# Patient Record
Sex: MALE | Race: WHITE | NOT HISPANIC OR LATINO | Employment: FULL TIME | URBAN - METROPOLITAN AREA
[De-identification: names, ages, dates, MRNs, and addresses within clinical notes are randomized per-mention and may not be internally consistent; named-entity substitution may affect disease eponyms.]

---

## 2023-11-28 RX ORDER — IBUPROFEN 800 MG/1
800 TABLET ORAL EVERY 8 HOURS PRN
Refills: 2 | OUTPATIENT
Start: 2023-11-28 | End: 2023-12-28

## 2023-11-28 RX ORDER — IBUPROFEN 800 MG/1
800 TABLET ORAL EVERY 8 HOURS PRN
COMMUNITY
Start: 2023-08-06 | End: 2024-03-26 | Stop reason: SDUPTHER

## 2023-11-28 NOTE — TELEPHONE ENCOUNTER
Patient requesting refill on Ibuprofen 800 mg one every 8 hours PRN.  Pharmacy [facility]

## 2024-03-22 DIAGNOSIS — I10 HYPERTENSION, UNSPECIFIED TYPE: Primary | ICD-10-CM

## 2024-03-22 DIAGNOSIS — M19.90 ARTHRITIS: ICD-10-CM

## 2024-03-25 RX ORDER — LISINOPRIL 40 MG/1
40 TABLET ORAL DAILY
Qty: 90 TABLET | Refills: 1 | Status: SHIPPED | OUTPATIENT
Start: 2024-03-25

## 2024-03-26 RX ORDER — IBUPROFEN 800 MG/1
800 TABLET ORAL EVERY 8 HOURS PRN
Qty: 90 TABLET | Refills: 0 | Status: SHIPPED | OUTPATIENT
Start: 2024-03-26

## 2024-06-27 ENCOUNTER — APPOINTMENT (OUTPATIENT)
Dept: PRIMARY CARE | Facility: CLINIC | Age: 69
End: 2024-06-27
Payer: COMMERCIAL

## 2024-06-27 VITALS
BODY MASS INDEX: 23.42 KG/M2 | HEIGHT: 67 IN | TEMPERATURE: 97.6 F | OXYGEN SATURATION: 99 % | WEIGHT: 149.2 LBS | SYSTOLIC BLOOD PRESSURE: 128 MMHG | DIASTOLIC BLOOD PRESSURE: 70 MMHG | HEART RATE: 94 BPM

## 2024-06-27 DIAGNOSIS — Z87.891 PERSONAL HISTORY OF NICOTINE DEPENDENCE: ICD-10-CM

## 2024-06-27 DIAGNOSIS — Z91.89 AT RISK FOR CARDIOVASCULAR EVENT: ICD-10-CM

## 2024-06-27 DIAGNOSIS — I10 PRIMARY HYPERTENSION: ICD-10-CM

## 2024-06-27 DIAGNOSIS — Z12.11 SCREEN FOR COLON CANCER: ICD-10-CM

## 2024-06-27 DIAGNOSIS — Z13.6 SCREENING FOR HEART DISEASE: ICD-10-CM

## 2024-06-27 DIAGNOSIS — Z12.2 SCREENING FOR LUNG CANCER: ICD-10-CM

## 2024-06-27 DIAGNOSIS — M19.90 ARTHRITIS: ICD-10-CM

## 2024-06-27 DIAGNOSIS — Z00.00 MEDICARE ANNUAL WELLNESS VISIT, SUBSEQUENT: Primary | ICD-10-CM

## 2024-06-27 RX ORDER — IBUPROFEN 800 MG/1
800 TABLET ORAL EVERY 8 HOURS PRN
Qty: 90 TABLET | Refills: 0 | Status: SHIPPED | OUTPATIENT
Start: 2024-06-27

## 2024-06-27 RX ORDER — ROSUVASTATIN CALCIUM 10 MG/1
10 TABLET, COATED ORAL DAILY
Qty: 30 TABLET | Refills: 11 | Status: SHIPPED | OUTPATIENT
Start: 2024-06-27 | End: 2025-06-27

## 2024-06-27 RX ORDER — LISINOPRIL 40 MG/1
40 TABLET ORAL DAILY
Qty: 90 TABLET | Refills: 1 | Status: SHIPPED | OUTPATIENT
Start: 2024-06-27

## 2024-06-27 ASSESSMENT — ACTIVITIES OF DAILY LIVING (ADL)
DRESSING: INDEPENDENT
MANAGING_FINANCES: INDEPENDENT
DOING_HOUSEWORK: INDEPENDENT
TAKING_MEDICATION: INDEPENDENT
TAKING_MEDICATION: INDEPENDENT
BATHING: INDEPENDENT
DRESSING: INDEPENDENT
BATHING: INDEPENDENT
GROCERY_SHOPPING: INDEPENDENT
MANAGING_FINANCES: INDEPENDENT
DOING_HOUSEWORK: INDEPENDENT
GROCERY_SHOPPING: INDEPENDENT

## 2024-06-27 ASSESSMENT — COLUMBIA-SUICIDE SEVERITY RATING SCALE - C-SSRS
6. HAVE YOU EVER DONE ANYTHING, STARTED TO DO ANYTHING, OR PREPARED TO DO ANYTHING TO END YOUR LIFE?: NO
2. HAVE YOU ACTUALLY HAD ANY THOUGHTS OF KILLING YOURSELF?: NO
1. IN THE PAST MONTH, HAVE YOU WISHED YOU WERE DEAD OR WISHED YOU COULD GO TO SLEEP AND NOT WAKE UP?: NO

## 2024-06-27 ASSESSMENT — PATIENT HEALTH QUESTIONNAIRE - PHQ9
SUM OF ALL RESPONSES TO PHQ9 QUESTIONS 1 AND 2: 0
1. LITTLE INTEREST OR PLEASURE IN DOING THINGS: NOT AT ALL
1. LITTLE INTEREST OR PLEASURE IN DOING THINGS: NOT AT ALL
2. FEELING DOWN, DEPRESSED OR HOPELESS: NOT AT ALL
2. FEELING DOWN, DEPRESSED OR HOPELESS: NOT AT ALL
SUM OF ALL RESPONSES TO PHQ9 QUESTIONS 1 AND 2: 0

## 2024-06-27 ASSESSMENT — ENCOUNTER SYMPTOMS
OCCASIONAL FEELINGS OF UNSTEADINESS: 0
DEPRESSION: 0
LOSS OF SENSATION IN FEET: 0

## 2024-06-27 ASSESSMENT — PAIN SCALES - GENERAL: PAINLEVEL: 4

## 2024-06-27 NOTE — PROGRESS NOTES
Today's Date: 6/27/2024       Subjective:     HPI: Ovidio Pisano is a 68 y.o. who presents today for health maintenance.     Hand arthritis - takes Ibuprofen 800mg as needed    HTN - not taking home Bps. Taking lisinopril 40mg    The 10-year ASCVD risk score (Dar VILLEGAS, et al., 2019) is: 25.1%    Values used to calculate the score:      Age: 68 years      Sex: Male      Is Non- : No      Diabetic: No      Tobacco smoker: Yes      Systolic Blood Pressure: 128 mmHg      Is BP treated: Yes      HDL Cholesterol: 36.2 mg/dL      Total Cholesterol: 191 mg/dL     Social history  Diet: eating healthy, decreased portions  Exercise: lift weights  Caffeine, EtOH, tobacco, drugs: coffee daily, 2 beers/day, used to smoke 2ppd, now down to 4-5 cig/day  Stress: controlled  Retired , built motors, drag races  SIGECAPS: negative     Preventative history  Flu/Vaccinations: due for RSV, PCV, shingrix, tdap  Colonoscopy: due for it  CT lung due    Review of Systems:     GEN: Denies fever, chills, weight loss  HEENT: Denies blurred vision, changes in hearing, sore throat, sinus congestion   Cardio: Denies chest pain, palpitations  Pulm: Denies shortness of breath, cough, wheezing  GI: Denies nausea, vomiting, diarrhea, blood in the stool  : Denies burning with urination, blood in the urine  Musculoskeletal: Denies muscle aches, joint aches  Skin: Denies rashes, itching  Neuro: Denies headaches, dizziness  Psyc: Denies depressed mood, thoughts of suicide      Allergies:   Allergies   Allergen Reactions    Penicillins Unknown        Current Medications:     Current Outpatient Medications:     ibuprofen 800 mg tablet, Take 1 tablet (800 mg) by mouth every 8 hours if needed for mild pain (1 - 3)., Disp: 90 tablet, Rfl: 0    lisinopril 40 mg tablet, TAKE 1 TABLET BY MOUTH EVERY DAY, Disp: 90 tablet, Rfl: 1     Past Medical History:  Past Medical History:   Diagnosis Date    Other fracture of left lower  "leg, initial encounter for closed fracture     Ankle fracture, left        Past Surgical History:   Past Surgical History:   Procedure Laterality Date    GALLBLADDER SURGERY  04/07/2017    Gallbladder Surgery    VASECTOMY  04/07/2017    Surgery Vas Deferens Vasectomy        Family History:  No family history on file.     Social History:  Social History     Socioeconomic History    Marital status:      Spouse name: Not on file    Number of children: Not on file    Years of education: Not on file    Highest education level: Not on file   Occupational History    Not on file   Tobacco Use    Smoking status: Every Day     Current packs/day: 0.25     Types: Cigarettes    Smokeless tobacco: Never    Tobacco comments:     Wants to stop smoking   Vaping Use    Vaping status: Never Used   Substance and Sexual Activity    Alcohol use: Yes     Alcohol/week: 8.0 standard drinks of alcohol     Types: 8 Cans of beer per week    Drug use: Never    Sexual activity: Not on file   Other Topics Concern    Not on file   Social History Narrative    Not on file     Social Determinants of Health     Financial Resource Strain: Not on file   Food Insecurity: Not on file   Transportation Needs: Not on file   Physical Activity: Not on file   Stress: Not on file   Social Connections: Not on file   Intimate Partner Violence: Not on file   Housing Stability: Not on file        Objective:  Vitals: Blood pressure 128/70, pulse 94, temperature 36.4 °C (97.6 °F), height 1.702 m (5' 7\"), weight 67.7 kg (149 lb 3.2 oz), SpO2 99%.  Body mass index is 23.37 kg/m².     Physical Examination:      General: No acute distress, well nourished  Eyes: EOMI  Ears: External ears intact, no gross fluid drainage, swelling, or erythema  ENT: Neck is supple, mucous membranes are pink and moist, no gross masses  Pulmonary: Clear to auscultation bilaterally, no wheezing, no rales, no accessory muscle use  Cardiac: RRR, systolic murmur 3/6  Abdomen: Soft, non " tender, non distended  Extremities: No clubbing, cyanosis, or edema  Psychiatric: Affect and mood is appropriate  Neurologic: AOx3, CN II-XII grossly in tact  Skin: No rashes observed, warm     Assessment and Plan:     Problem List Items Addressed This Visit    None  Visit Diagnoses       Medicare annual wellness visit, subsequent    -  Primary    Arthritis        Screen for colon cancer        Primary hypertension        At risk for cardiovascular event        Screening for lung cancer        Screening for heart disease              -Extremely active individual, actively working on completely stopping smoking.  We had a conversation for about 5 minutes regarding smoking cessation he is highly motivated to do this on his own without the assistance of nicotine replacement or medications.  - Hypertension stable, continue lisinopril  - Arthritis, using ibuprofen as needed  - ASCVD 25%, ordered CT cardiac scoring and will start rosuvastatin 10 mg  - As he is still smoking, yearly CT lung screening ordered  - Colonoscopy ordered  - Labs as above  - He is due for 4 vaccines as above, he will get these done at the pharmacy  - At next visit, discuss AAA screening    All questions answered. Patient understands and agrees to the plan.    Dano Yip DO  Sports Medicine Fellow  [facility] [place]

## 2024-07-24 DIAGNOSIS — M19.90 ARTHRITIS: ICD-10-CM

## 2024-07-24 RX ORDER — IBUPROFEN 800 MG/1
800 TABLET ORAL EVERY 8 HOURS PRN
Qty: 90 TABLET | Refills: 0 | Status: SHIPPED | OUTPATIENT
Start: 2024-07-24

## 2024-10-14 DIAGNOSIS — M19.90 ARTHRITIS: ICD-10-CM

## 2024-10-14 RX ORDER — IBUPROFEN 800 MG/1
800 TABLET ORAL EVERY 8 HOURS PRN
Qty: 90 TABLET | Refills: 0 | Status: SHIPPED | OUTPATIENT
Start: 2024-10-14

## 2024-10-14 NOTE — TELEPHONE ENCOUNTER
Patient called for refill for Ibuprofen 800mg takes one every 8 hours PRN pain. # 90  Pharmacy [facility]

## 2024-11-09 DIAGNOSIS — M19.90 ARTHRITIS: ICD-10-CM

## 2024-11-11 RX ORDER — IBUPROFEN 800 MG/1
800 TABLET ORAL EVERY 8 HOURS PRN
Qty: 90 TABLET | Refills: 0 | Status: SHIPPED | OUTPATIENT
Start: 2024-11-11

## 2024-12-12 DIAGNOSIS — M19.90 ARTHRITIS: ICD-10-CM

## 2024-12-12 RX ORDER — IBUPROFEN 800 MG/1
800 TABLET ORAL EVERY 8 HOURS PRN
Qty: 90 TABLET | Refills: 0 | Status: SHIPPED | OUTPATIENT
Start: 2024-12-12

## 2025-01-14 DIAGNOSIS — M19.90 ARTHRITIS: ICD-10-CM

## 2025-01-15 RX ORDER — IBUPROFEN 800 MG/1
800 TABLET ORAL EVERY 8 HOURS PRN
Qty: 90 TABLET | Refills: 0 | OUTPATIENT
Start: 2025-01-15

## 2025-01-21 DIAGNOSIS — M19.90 ARTHRITIS: ICD-10-CM

## 2025-01-21 RX ORDER — IBUPROFEN 800 MG/1
800 TABLET ORAL EVERY 8 HOURS PRN
Qty: 90 TABLET | Refills: 0 | Status: SHIPPED | OUTPATIENT
Start: 2025-01-21

## 2025-02-21 DIAGNOSIS — M19.90 ARTHRITIS: ICD-10-CM

## 2025-02-21 RX ORDER — IBUPROFEN 800 MG/1
800 TABLET ORAL EVERY 8 HOURS PRN
Qty: 90 TABLET | Refills: 0 | Status: SHIPPED | OUTPATIENT
Start: 2025-02-21

## 2025-02-24 ENCOUNTER — APPOINTMENT (OUTPATIENT)
Dept: RADIOLOGY | Facility: HOSPITAL | Age: 70
End: 2025-02-24
Payer: MEDICARE

## 2025-02-24 ENCOUNTER — HOSPITAL ENCOUNTER (EMERGENCY)
Facility: HOSPITAL | Age: 70
Discharge: HOME | End: 2025-02-24
Attending: STUDENT IN AN ORGANIZED HEALTH CARE EDUCATION/TRAINING PROGRAM
Payer: MEDICARE

## 2025-02-24 VITALS
RESPIRATION RATE: 16 BRPM | BODY MASS INDEX: 23.54 KG/M2 | TEMPERATURE: 97.7 F | OXYGEN SATURATION: 96 % | HEART RATE: 98 BPM | DIASTOLIC BLOOD PRESSURE: 92 MMHG | WEIGHT: 150 LBS | SYSTOLIC BLOOD PRESSURE: 148 MMHG | HEIGHT: 67 IN

## 2025-02-24 DIAGNOSIS — S61.209A AVULSION OF FINGER, INITIAL ENCOUNTER: Primary | ICD-10-CM

## 2025-02-24 PROCEDURE — 99284 EMERGENCY DEPT VISIT MOD MDM: CPT | Mod: 25

## 2025-02-24 PROCEDURE — 2500000004 HC RX 250 GENERAL PHARMACY W/ HCPCS (ALT 636 FOR OP/ED): Performed by: STUDENT IN AN ORGANIZED HEALTH CARE EDUCATION/TRAINING PROGRAM

## 2025-02-24 PROCEDURE — 99283 EMERGENCY DEPT VISIT LOW MDM: CPT | Performed by: STUDENT IN AN ORGANIZED HEALTH CARE EDUCATION/TRAINING PROGRAM

## 2025-02-24 PROCEDURE — 2500000005 HC RX 250 GENERAL PHARMACY W/O HCPCS: Performed by: STUDENT IN AN ORGANIZED HEALTH CARE EDUCATION/TRAINING PROGRAM

## 2025-02-24 PROCEDURE — 90471 IMMUNIZATION ADMIN: CPT | Performed by: STUDENT IN AN ORGANIZED HEALTH CARE EDUCATION/TRAINING PROGRAM

## 2025-02-24 PROCEDURE — 73140 X-RAY EXAM OF FINGER(S): CPT | Mod: LT

## 2025-02-24 PROCEDURE — 2500000001 HC RX 250 WO HCPCS SELF ADMINISTERED DRUGS (ALT 637 FOR MEDICARE OP): Performed by: STUDENT IN AN ORGANIZED HEALTH CARE EDUCATION/TRAINING PROGRAM

## 2025-02-24 PROCEDURE — 73140 X-RAY EXAM OF FINGER(S): CPT | Mod: LEFT SIDE | Performed by: RADIOLOGY

## 2025-02-24 PROCEDURE — 90715 TDAP VACCINE 7 YRS/> IM: CPT | Performed by: STUDENT IN AN ORGANIZED HEALTH CARE EDUCATION/TRAINING PROGRAM

## 2025-02-24 RX ORDER — LIDOCAINE HYDROCHLORIDE 10 MG/ML
5 INJECTION, SOLUTION INFILTRATION; PERINEURAL ONCE
Status: COMPLETED | OUTPATIENT
Start: 2025-02-24 | End: 2025-02-24

## 2025-02-24 RX ORDER — OXYCODONE AND ACETAMINOPHEN 5; 325 MG/1; MG/1
1 TABLET ORAL ONCE
Status: COMPLETED | OUTPATIENT
Start: 2025-02-24 | End: 2025-02-24

## 2025-02-24 RX ORDER — CEPHALEXIN 500 MG/1
500 CAPSULE ORAL ONCE
Status: COMPLETED | OUTPATIENT
Start: 2025-02-24 | End: 2025-02-24

## 2025-02-24 RX ORDER — CEPHALEXIN 500 MG/1
500 CAPSULE ORAL 4 TIMES DAILY
Qty: 40 CAPSULE | Refills: 0 | Status: SHIPPED | OUTPATIENT
Start: 2025-02-24 | End: 2025-03-06

## 2025-02-24 RX ORDER — OXYCODONE AND ACETAMINOPHEN 5; 325 MG/1; MG/1
1 TABLET ORAL EVERY 6 HOURS PRN
Qty: 8 TABLET | Refills: 0 | Status: SHIPPED | OUTPATIENT
Start: 2025-02-24 | End: 2025-02-27

## 2025-02-24 RX ADMIN — CEPHALEXIN 500 MG: 500 CAPSULE ORAL at 22:24

## 2025-02-24 RX ADMIN — Medication 1 EACH: at 22:50

## 2025-02-24 RX ADMIN — OXYCODONE HYDROCHLORIDE AND ACETAMINOPHEN 1 TABLET: 5; 325 TABLET ORAL at 23:44

## 2025-02-24 RX ADMIN — OXYCODONE HYDROCHLORIDE AND ACETAMINOPHEN 1 TABLET: 5; 325 TABLET ORAL at 21:33

## 2025-02-24 RX ADMIN — TETANUS TOXOID, REDUCED DIPHTHERIA TOXOID AND ACELLULAR PERTUSSIS VACCINE, ADSORBED 0.5 ML: 5; 2.5; 8; 8; 2.5 SUSPENSION INTRAMUSCULAR at 21:34

## 2025-02-24 RX ADMIN — LIDOCAINE HYDROCHLORIDE 5 ML: 10 INJECTION, SOLUTION INFILTRATION; PERINEURAL at 22:32

## 2025-02-24 ASSESSMENT — PAIN DESCRIPTION - LOCATION: LOCATION: FINGER (COMMENT WHICH ONE)

## 2025-02-24 ASSESSMENT — PAIN SCALES - GENERAL
PAINLEVEL_OUTOF10: 0 - NO PAIN
PAINLEVEL_OUTOF10: 10 - WORST POSSIBLE PAIN
PAINLEVEL_OUTOF10: 6
PAINLEVEL_OUTOF10: 10 - WORST POSSIBLE PAIN

## 2025-02-24 ASSESSMENT — LIFESTYLE VARIABLES
TOTAL SCORE: 0
HAVE PEOPLE ANNOYED YOU BY CRITICIZING YOUR DRINKING: NO
EVER HAD A DRINK FIRST THING IN THE MORNING TO STEADY YOUR NERVES TO GET RID OF A HANGOVER: NO
EVER FELT BAD OR GUILTY ABOUT YOUR DRINKING: NO
HAVE YOU EVER FELT YOU SHOULD CUT DOWN ON YOUR DRINKING: NO

## 2025-02-24 ASSESSMENT — COLUMBIA-SUICIDE SEVERITY RATING SCALE - C-SSRS
2. HAVE YOU ACTUALLY HAD ANY THOUGHTS OF KILLING YOURSELF?: NO
1. IN THE PAST MONTH, HAVE YOU WISHED YOU WERE DEAD OR WISHED YOU COULD GO TO SLEEP AND NOT WAKE UP?: NO
6. HAVE YOU EVER DONE ANYTHING, STARTED TO DO ANYTHING, OR PREPARED TO DO ANYTHING TO END YOUR LIFE?: NO

## 2025-02-24 ASSESSMENT — PAIN DESCRIPTION - ORIENTATION: ORIENTATION: LEFT

## 2025-02-24 ASSESSMENT — PAIN - FUNCTIONAL ASSESSMENT: PAIN_FUNCTIONAL_ASSESSMENT: 0-10

## 2025-02-25 NOTE — ED TRIAGE NOTES
Pt BIB POV from home w/ c/o cutting off nearly entire distal phalanx of left ring finger w/ log splitter - has it w/ him in a jar w/ ice water. Happened 3 hours ago. Last TD out of date. Bleeding controlled.

## 2025-02-25 NOTE — ED PROVIDER NOTES
History/Exam limitations: none  HPI was provided by patient    HPI:    Chief Complaint   Patient presents with    Hand Injury    Fingertip Amputation        Ovidio Pisano is a 69 y.o. male presents with chief complaint of finger laceration.  He had avulsed the tip of his ring finger around 3-4 o'clock today while he was using a wood splitter.  Does have the finger here and he placed on ice.  Pressure place prior to arrival.  Denies he is on any blood thinning medications.       ROS:All other review of systems are negative except as noted above and HPI or ROS.   CONSTITUTIONAL: fever, chills  CARDIOVASCULAR: chest pain, palpitations  RESPIRATORY: cough, wheeze, shortness of breath  GI: abdominal pain, nausea, vomiting  MUSCULOSKELETAL: deformity, pain  SKIN: rash, color change  NEUROLOGIC: numbness, focal weakness  NOTES: All systems reviewed, negative except as described above       Physical Exam:  GENERAL: Alert, oriented , cooperative,  in no acute distress.    SKIN: Intact,  dry skin, no lesions.    PULMONARY: Clear bilaterally. No crackles, rhonchi, wheezing.  No respiratory distress.  No work of breathing.    CARDIAC: Regular rate and rhythm.  Pulses 2+ and radials.  No murmur, rub.    MUSCULOSKELETAL:   Avulsed injury to distal left ring finger phalange.  Tender to palpation obvious.  See photo below for more detail   NEUROLOGICAL: Strength proximal to digit 5 out of 5  strength.       MDM/ED COURSE:        The patient presented for evaluation hand  pain.  Differential includes but not limited to fracture, sprain, strain, dislocation.  Imaging studies if performed were independently reviewed and interpreted by myself and confirmed by radiologist.  Bleeding controlled prior to discharge.       The patient has stable vs and will be discharge home.   The patient and caregiver are in agreement with the plan and given instructions to follow up with Ortho.      I discussed the differential, results and plan with  the patient and/or family/friend/caregiver if present.       I emphasized the importance of follow-up with the physician I referred them to in the timeframe recommended.  I explained reasons for the patient to return to the Emergency Department. Additional verbal discharge instructions were also given and discussed with the patient to supplement those generated by the EMR. We also discussed medications that were prescribed (if any) including common side effects and interactions. The patient was advised to abstain from driving, operating heavy machinery or making significant decisions while taking medications such as opiates and muscle relaxers that may impair this. All questions were addressed.  They understand return precautions and discharge instructions. The patient and/or family/friend/caregiver expressed understanding.     Note: This note was dictated by speech recognition. Minor errors in transcription may be present.    ED Course as of 02/25/25 0438 Mon Feb 24, 2025 2131 XR fingers left 2+ views  1. Amputation of distal most portion of fourth digit distal phalynx  with associated soft tissue swelling and soft tissue gas.  2. Two radiopaque foreign bodies   [WL]   2144 Spoke with Dr. Reynoso covering for Ortho who advised On ice or not that's not replantable. Wash it out, close what you can, nonadherent dressing cover with tubegauze and have pt see Yoli or Feliciano this week for Amp revision [WL]   2323 Digital block was performed patient stated did not want sutures and did not think any area could be sutured and I will just have the dressing and will follow-up with Ortho.  Given a prescription for Percocet and Keflex with initial dose given here to go home with. [WL]      ED Course User Index  [WL] Jacques Lawrence DO         Diagnoses as of 02/25/25 0438   Avulsion of finger, initial encounter         Past Medical History:   Diagnosis Date    Other fracture of left lower leg, initial encounter for closed  fracture     Ankle fracture, left      Social History     Socioeconomic History    Marital status:    Tobacco Use    Smoking status: Every Day     Current packs/day: 0.25     Types: Cigarettes    Smokeless tobacco: Never    Tobacco comments:     Wants to stop smoking   Vaping Use    Vaping status: Never Used   Substance and Sexual Activity    Alcohol use: Yes     Alcohol/week: 8.0 standard drinks of alcohol     Types: 8 Cans of beer per week    Drug use: Never     Current Outpatient Medications   Medication Instructions    cephalexin (KEFLEX) 500 mg, oral, 4 times daily    ibuprofen 800 mg, oral, Every 8 hours PRN    lisinopril 40 mg, oral, Daily    oxyCODONE-acetaminophen (Percocet) 5-325 mg tablet 1 tablet, oral, Every 6 hours PRN    rosuvastatin (CRESTOR) 10 mg, oral, Daily     Allergies   Allergen Reactions    Penicillins Unknown     As a child - GI upset/vomiting, unknown now.              ED Triage Vitals [02/24/25 1944]   Temperature Heart Rate Respirations BP   36.7 °C (98.1 °F) (!) 104 18 (!) 151/101      Pulse Ox Temp Source Heart Rate Source Patient Position   96 % Temporal Monitor Sitting      BP Location FiO2 (%)     Left arm --               Labs and Imaging  XR fingers left 2+ views   Final Result   1. Amputation of distal most portion of fourth digit distal phalynx   with associated soft tissue swelling and soft tissue gas.   2. Two radiopaque foreign bodies as described above.   Signed by Eldon Wyman MD        Labs Reviewed - No data to display        Procedure  Procedures                  Jacques Lawrence DO  02/25/25 0715

## 2025-03-28 DIAGNOSIS — M19.90 ARTHRITIS: ICD-10-CM

## 2025-03-28 RX ORDER — IBUPROFEN 800 MG/1
800 TABLET ORAL EVERY 8 HOURS PRN
Qty: 90 TABLET | Refills: 0 | Status: SHIPPED | OUTPATIENT
Start: 2025-03-28

## 2025-03-28 NOTE — TELEPHONE ENCOUNTER
Patient called wanting a refill on Ibuprofen 800 mg take one every 8 hours PRN pain.   Patient states that he had lost a finger recently and has been using the ibuprofen more then normal so he can make the pain meds he received from the hospital last longer.    I tried to call patient to schedule appt but no answer at this time.  Pharmacy [facility]

## 2025-03-30 DIAGNOSIS — I10 PRIMARY HYPERTENSION: ICD-10-CM

## 2025-03-31 RX ORDER — LISINOPRIL 40 MG/1
40 TABLET ORAL DAILY
Qty: 90 TABLET | Refills: 1 | OUTPATIENT
Start: 2025-03-31

## 2025-04-21 DIAGNOSIS — I10 PRIMARY HYPERTENSION: ICD-10-CM

## 2025-04-21 RX ORDER — LISINOPRIL 40 MG/1
40 TABLET ORAL DAILY
Qty: 90 TABLET | Refills: 1 | Status: SHIPPED | OUTPATIENT
Start: 2025-04-21

## 2025-05-06 DIAGNOSIS — M19.90 ARTHRITIS: ICD-10-CM

## 2025-05-06 RX ORDER — IBUPROFEN 800 MG/1
800 TABLET ORAL EVERY 8 HOURS PRN
Qty: 90 TABLET | Refills: 0 | Status: SHIPPED | OUTPATIENT
Start: 2025-05-06

## 2025-05-28 DIAGNOSIS — M19.90 ARTHRITIS: ICD-10-CM

## 2025-05-28 NOTE — TELEPHONE ENCOUNTER
Patient needs refill on Ibuprofen 800mg takes one every 8 hours as needed  Pharmacy [facility]

## 2025-05-29 RX ORDER — IBUPROFEN 800 MG/1
800 TABLET, FILM COATED ORAL EVERY 8 HOURS PRN
Qty: 90 TABLET | Refills: 0 | Status: SHIPPED | OUTPATIENT
Start: 2025-05-29

## 2025-06-26 DIAGNOSIS — I10 PRIMARY HYPERTENSION: ICD-10-CM

## 2025-06-26 DIAGNOSIS — Z91.89 AT RISK FOR CARDIOVASCULAR EVENT: ICD-10-CM

## 2025-06-26 DIAGNOSIS — M19.90 ARTHRITIS: ICD-10-CM

## 2025-06-27 DIAGNOSIS — Z91.89 AT RISK FOR CARDIOVASCULAR EVENT: ICD-10-CM

## 2025-06-27 RX ORDER — ROSUVASTATIN CALCIUM 10 MG/1
10 TABLET, COATED ORAL DAILY
Qty: 90 TABLET | Refills: 0 | Status: SHIPPED | OUTPATIENT
Start: 2025-06-27 | End: 2025-09-25

## 2025-06-27 RX ORDER — LISINOPRIL 40 MG/1
40 TABLET ORAL DAILY
Qty: 90 TABLET | Refills: 0 | Status: SHIPPED | OUTPATIENT
Start: 2025-06-27 | End: 2025-09-25

## 2025-06-27 RX ORDER — IBUPROFEN 800 MG/1
800 TABLET, FILM COATED ORAL EVERY 8 HOURS PRN
Qty: 90 TABLET | Refills: 0 | Status: SHIPPED | OUTPATIENT
Start: 2025-06-27

## 2025-06-27 RX ORDER — ROSUVASTATIN CALCIUM 10 MG/1
10 TABLET, COATED ORAL DAILY
Qty: 90 TABLET | Refills: 3 | OUTPATIENT
Start: 2025-06-27

## 2025-08-08 ENCOUNTER — APPOINTMENT (OUTPATIENT)
Facility: CLINIC | Age: 70
End: 2025-08-08
Payer: MEDICARE

## 2025-08-08 VITALS
HEART RATE: 74 BPM | SYSTOLIC BLOOD PRESSURE: 130 MMHG | DIASTOLIC BLOOD PRESSURE: 86 MMHG | HEIGHT: 67 IN | WEIGHT: 154 LBS | BODY MASS INDEX: 24.17 KG/M2 | OXYGEN SATURATION: 99 %

## 2025-08-08 DIAGNOSIS — R91.8 LUNG NODULES: ICD-10-CM

## 2025-08-08 DIAGNOSIS — Z13.1 SCREENING FOR DIABETES MELLITUS: ICD-10-CM

## 2025-08-08 DIAGNOSIS — Z13.29 SCREENING FOR ENDOCRINE DISORDER: ICD-10-CM

## 2025-08-08 DIAGNOSIS — Z72.0 TOBACCO USE: ICD-10-CM

## 2025-08-08 DIAGNOSIS — Z13.9 SCREENING FOR CONDITION: ICD-10-CM

## 2025-08-08 DIAGNOSIS — Z00.00 ENCOUNTER FOR PREVENTIVE HEALTH EXAMINATION: Primary | ICD-10-CM

## 2025-08-08 DIAGNOSIS — I71.21 ANEURYSM OF ASCENDING AORTA WITHOUT RUPTURE: ICD-10-CM

## 2025-08-08 DIAGNOSIS — Z91.89 AT RISK FOR CARDIOVASCULAR EVENT: ICD-10-CM

## 2025-08-08 DIAGNOSIS — Z87.891 HISTORY OF SMOKING GREATER THAN 50 PACK YEARS: ICD-10-CM

## 2025-08-08 DIAGNOSIS — I25.10 CORONARY ARTERY CALCIFICATION: ICD-10-CM

## 2025-08-08 DIAGNOSIS — R53.83 FATIGUE, UNSPECIFIED TYPE: ICD-10-CM

## 2025-08-08 DIAGNOSIS — M19.90 ARTHRITIS: ICD-10-CM

## 2025-08-08 DIAGNOSIS — Z13.6 SCREENING FOR CARDIOVASCULAR CONDITION: ICD-10-CM

## 2025-08-08 DIAGNOSIS — I10 PRIMARY HYPERTENSION: ICD-10-CM

## 2025-08-08 PROCEDURE — G0439 PPPS, SUBSEQ VISIT: HCPCS

## 2025-08-08 PROCEDURE — 3079F DIAST BP 80-89 MM HG: CPT

## 2025-08-08 PROCEDURE — 3008F BODY MASS INDEX DOCD: CPT

## 2025-08-08 PROCEDURE — 1170F FXNL STATUS ASSESSED: CPT

## 2025-08-08 PROCEDURE — 3075F SYST BP GE 130 - 139MM HG: CPT

## 2025-08-08 PROCEDURE — 1159F MED LIST DOCD IN RCRD: CPT

## 2025-08-08 RX ORDER — MELOXICAM 7.5 MG/1
7.5 TABLET ORAL DAILY
Qty: 30 TABLET | Refills: 0 | Status: SHIPPED | OUTPATIENT
Start: 2025-08-08 | End: 2025-09-07

## 2025-08-08 RX ORDER — LISINOPRIL 40 MG/1
40 TABLET ORAL DAILY
Qty: 180 TABLET | Refills: 1 | Status: SHIPPED | OUTPATIENT
Start: 2025-08-08 | End: 2026-08-03

## 2025-08-08 RX ORDER — ROSUVASTATIN CALCIUM 10 MG/1
10 TABLET, COATED ORAL DAILY
Qty: 180 TABLET | Refills: 1 | Status: SHIPPED | OUTPATIENT
Start: 2025-08-08 | End: 2026-08-03

## 2025-08-08 ASSESSMENT — ENCOUNTER SYMPTOMS
LOSS OF SENSATION IN FEET: 0
OCCASIONAL FEELINGS OF UNSTEADINESS: 0
DEPRESSION: 0

## 2025-08-08 ASSESSMENT — ACTIVITIES OF DAILY LIVING (ADL)
TAKING_MEDICATION: INDEPENDENT
GROCERY_SHOPPING: INDEPENDENT
DRESSING: INDEPENDENT
DOING_HOUSEWORK: INDEPENDENT
BATHING: INDEPENDENT
MANAGING_FINANCES: INDEPENDENT

## 2025-08-08 ASSESSMENT — PATIENT HEALTH QUESTIONNAIRE - PHQ9
2. FEELING DOWN, DEPRESSED OR HOPELESS: NOT AT ALL
SUM OF ALL RESPONSES TO PHQ9 QUESTIONS 1 AND 2: 0
1. LITTLE INTEREST OR PLEASURE IN DOING THINGS: NOT AT ALL

## 2025-08-08 NOTE — PROGRESS NOTES
"Routine Physical Exam    Today's Date: 8/8/2025       HPI: Ovidio Pisano is a 69 y.o. male with PMH of HTN, coronary artery disease who presents today for Medicare annual wellness visit and management of chronic conditions.      Preventative history:  Vaccinations:   -Influenza: due this fall   -Pneumonia: denies  -Shingles: denies  -Tdap: up to date    -Colonoscopy: never completed. Declines today.   -Low-dose lung CT: last done  2023  -Triple AAA: declines screening today    Chronic conditions management:    Arthritis  -Managed with Ibuprofen 800mg  -Pt states he is taking 2x daily for his hand arthritis pain  -Pain is worse when it is raining or damp outside - then he takes Ibuprofen 3x daily     HTN  -Managed with Lisinopril 40mg daily  -BP in clinic today: 130/86 mmHg  -Does not take BP at home  -Denies chest pain, palpitations, leg swelling, lightheadedness, SOB    Abnormal heart score  -Pt states he is no longer taking Rosuvastatin 10mg daily   -States that his friend passed away this year from cancer and he now knows that he wants to let things \"run their natural course\"  -States that none of the men in his family have lived to age 80 and he does not plan on living that long either  -He states that he does not think that he will make it to age 76, so there is no point in taking any medications for his cholesterol or heart. He also notes that his lipid panel is normal and he does not understand why he was started on the Rosuvastatin in the first place  -Pt denies SI and HI   -He states that he is very healthy for his age      Acute complaints today:   -none     Social history:  EtOH: rarely  Tobacco: since age 14, initially 1 packs daily, now has cut down to 1/2 ppd in the past year  Vaping: denies  Drugs: denies      Review of Systems:  GEN: Denies fever, chills, unexpected weight loss  HEENT: Denies blurred vision, changes in hearing, sore throat, congestion, rhinorrhea  Cardio: Denies chest pain, " "palpitations  Pulm: Denies shortness of breath, cough, wheezing  GI: Denies abdominal pain, nausea, vomiting, diarrhea, constipation, blood in the stool  : Denies dysuria, urgency, frequency, hematuria  Musculoskeletal: Denies muscle aches, joint aches  Skin: Denies rashes, itching  Neuro: Denies headaches, dizziness  Psyc: Denies depressed mood, thoughts of suicide      Allergies:   Allergies[1]     Current Medications:   Medications Ordered Prior to Encounter[2]    Past Medical History:  Medical History[3]  Medical History[4]    Past Surgical History:   Surgical History[5]  Surgical History[6]     Family History:  Family History[7]  Family History[8]     Social History:  Social History     Socioeconomic History    Marital status:      Spouse name: Not on file    Number of children: Not on file    Years of education: Not on file    Highest education level: Not on file   Occupational History    Not on file   Tobacco Use    Smoking status: Every Day     Current packs/day: 0.25     Types: Cigarettes    Smokeless tobacco: Never    Tobacco comments:     Wants to stop smoking   Vaping Use    Vaping status: Never Used   Substance and Sexual Activity    Alcohol use: Yes     Alcohol/week: 8.0 standard drinks of alcohol     Types: 8 Cans of beer per week    Drug use: Never    Sexual activity: Not on file   Other Topics Concern    Not on file   Social History Narrative    Not on file     Social Drivers of Health     Financial Resource Strain: Not on file   Food Insecurity: Not on file   Transportation Needs: Not on file   Physical Activity: Not on file   Stress: Not on file   Social Connections: Not on file   Intimate Partner Violence: Not on file   Housing Stability: Not on file        Objective:  Vitals: Blood pressure 130/86, pulse 74, height 1.702 m (5' 7\"), weight 69.9 kg (154 lb), SpO2 99%.  Body mass index is 24.12 kg/m².     Physical Exam:  GENERAL: Alert, oriented, well nourished, appears older than stated " age, in no acute distress.   HEENT: Head normocephalic, atraumatic. EOMI. Mucous membranes moist.  CARDIOVASCULAR: Heart with regular rate and rhythm, normal S1/S2, no murmurs; normal peripheral pulses- radial and dorsalis pedis palpable.  LUNGS: Clear to auscultation bilaterally without wheezing, rhonchi or rales; good respiratory effort, no increased work of breathing.  EXTREMITIES: s/p amputation of left 4th digit at DIP joint. Swelling noted in the PIP and DIP joints of bilateral hands.  NEURO: Alert and oriented to person, place, and time. No focal deficits.   PSYCH: Appropriate mood and affect.  SKIN: No rashes or lesions appreciated    Assessment/Plan   Diagnoses and all orders for this visit:  Encounter for preventive health examination  -     CBC; Future  -     Comprehensive Metabolic Panel; Future  -     Hemoglobin A1C; Future  -     Lipid Panel; Future  -     TSH with reflex to Free T4 if abnormal; Future  -Pt declines to complete annual blood work at this time. However, advised pt that we cannot refill his Ibuprofen if he does not complete blood work. We will order labs today and have requested pt to complete them per his convenience. He understands and agrees.   -Pt declines all vaccinations today  -Pt declines colonoscopy today. Advised pt that colonoscopies are used to screen for colon cancer, and declining this screening can delay the detection of colorectal cancer and other serious conditions, and even lead to death. Pt understands and declines colonoscopy.   -Ordered low dose CT lung screening. Pt agreeable.   -Pt declines triple AAA screening.   Arthritis  -Pt would like refill for Ibuprofen. Advised pt that we will not refill his Ibuprofen prescription without checking his kidney function as he takes Ibuprofen 2-3x daily. Pt initially declined labwork, but then agreed. As such, we have ordered his annual labs, including CMP. We will only refill his Ibuprofen after he finishes CMP and if kidney  function is WNL. Until then, we have prescribed him Meloxicam at this time for his arthritic hand pain.   -Comprehensive Metabolic Panel; Future  -     Prescribed meloxicam (Mobic) 7.5 mg tablet; Take 1 tablet (7.5 mg) by mouth once daily.  Primary hypertension  -Well controlled  -     Comprehensive Metabolic Panel; Future  -    Refilled lisinopril 40 mg tablet; Take 1 tablet (40 mg) by mouth once daily.  Coronary artery calcification  -Had an extensive discussion with the pt regarding his coronary disease. CT lung screen completed in 2023 demonstrated significant coronary artery calcification.   -In addition, explained to the pt that although his lipid panel completed in 2021 demonstrated normal total cholesterol level, his LDL cholesterol level was high. And his ASCVD risk score is 26.7%. Given these findings, we recommend continuing statin therapy at this time.   -Pt continues to state that the men in his family do not live long and he does not believe he will either. He continues to state that he is very healthy for his age.   -Advised pt that our recommendation is to continue with statin therapy, but in the end it is his choice. Pt states he will think about whether he wants to remain on this medication or not, and make a decision later. We will refill his statin at this time and then leave the choice to the pt.   -    Refilled  rosuvastatin (Crestor) 10 mg tablet; Take 1 tablet (10 mg) by mouth once daily.  Aneurysm of ascending aorta without rupture  -Pt declines further imaging   History of smoking greater than 50 pack years  Lung nodules  Tobacco use  -We have ordered CT lung screening at this time. Pt is agreeable to complete.   -     CT lung screening low dose; Future    Follow up in 1 year for annual wellness visit or sooner for chronic conditions management.     All questions answered. Patient understands and agrees to the plan. Patient discussed with attending, Dr. Shankar.    James Deutsch,  DO  PGY-2, [facility]           [1]   Allergies  Allergen Reactions    Penicillins Unknown     As a child - GI upset/vomiting, unknown now.    [2]   Current Outpatient Medications on File Prior to Visit   Medication Sig Dispense Refill    ibuprofen 800 mg tablet Take 1 tablet (800 mg) by mouth every 8 hours if needed for mild pain (1 - 3). 90 tablet 0    [DISCONTINUED] lisinopril 40 mg tablet Take 1 tablet (40 mg) by mouth once daily. 90 tablet 0    [DISCONTINUED] rosuvastatin (Crestor) 10 mg tablet Take 1 tablet (10 mg) by mouth once daily. (Patient not taking: Reported on 8/8/2025) 90 tablet 0     No current facility-administered medications on file prior to visit.   [3]   Past Medical History:  Diagnosis Date    Other fracture of left lower leg, initial encounter for closed fracture     Ankle fracture, left   [4]   Past Medical History:  Diagnosis Date    Other fracture of left lower leg, initial encounter for closed fracture     Ankle fracture, left   [5]   Past Surgical History:  Procedure Laterality Date    GALLBLADDER SURGERY  04/07/2017    Gallbladder Surgery    VASECTOMY  04/07/2017    Surgery Vas Deferens Vasectomy   [6]   Past Surgical History:  Procedure Laterality Date    GALLBLADDER SURGERY  04/07/2017    Gallbladder Surgery    VASECTOMY  04/07/2017    Surgery Vas Deferens Vasectomy   [7] No family history on file.  [8] No family history on file.

## 2025-08-09 NOTE — PROGRESS NOTES
I reviewed and examined the patient. I was present for the key exam elements, and I fully participated in the patient's care. I discussed the management of the care with the resident. I have personally reviewed the pertinent labs and imaging, as well as recent notes, with the patient. I have reviewed the note above and agree with the resident's medical decision making as documented in the resident's note, in addition to the following comments / findings:     Agree with the rest of the plan outlined below by resident physician. No red flags.      The patient understands and agrees to the assessment and plan of care. Patient has also agreed to follow up and comply with the treatment and evaluation as recommended today. Patient was instructed to call the office at [phone] should questions arise regarding their treatment or care.     Sunday Shankar DO, FAOASM  Family Medicine   [address], [address]  [place] [address]     Sunday Shankar DO

## 2026-08-10 ENCOUNTER — APPOINTMENT (OUTPATIENT)
Facility: CLINIC | Age: 71
End: 2026-08-10
Payer: MEDICARE